# Patient Record
Sex: FEMALE | Race: WHITE | Employment: STUDENT | URBAN - METROPOLITAN AREA
[De-identification: names, ages, dates, MRNs, and addresses within clinical notes are randomized per-mention and may not be internally consistent; named-entity substitution may affect disease eponyms.]

---

## 2017-02-23 RX ORDER — BUSPIRONE HYDROCHLORIDE 15 MG/1
15 TABLET ORAL 2 TIMES DAILY
COMMUNITY
End: 2022-09-02

## 2017-02-23 RX ORDER — DEXTROAMPHETAMINE SACCHARATE, AMPHETAMINE ASPARTATE, DEXTROAMPHETAMINE SULFATE AND AMPHETAMINE SULFATE 3.75; 3.75; 3.75; 3.75 MG/1; MG/1; MG/1; MG/1
15 TABLET ORAL
COMMUNITY

## 2017-02-23 RX ORDER — ESCITALOPRAM OXALATE 20 MG/1
20 TABLET ORAL
COMMUNITY
End: 2022-09-02

## 2017-02-23 RX ORDER — LANOLIN ALCOHOL/MO/W.PET/CERES
500 CREAM (GRAM) TOPICAL DAILY
COMMUNITY

## 2017-02-23 RX ORDER — ALPRAZOLAM 0.25 MG/1
0.25 TABLET ORAL
COMMUNITY
End: 2022-09-02

## 2017-02-23 RX ORDER — LEVOMEFOLATE CALCIUM 15 MG
15 TABLET ORAL
COMMUNITY

## 2017-02-23 NOTE — PERIOP NOTES
PRE-OP INSTRUCTIONS GIVEN OVER TELEPHONE , WHICH INCLUDED INSTRUCTIONS ON MEDICATIONS TO TAKE DAY OF SURGERY AND MEDICATIONS TO STOP PRIOR TO SURGERY. AS WELL AS VERBAL AND WRITTEN INSTRUCTIONS  ON USE OF CHLORHEXIDINE WIPES THE EVENING BEFORE SURGERY AND THE MORNING OF SURGERY. PATIENT'S ADDRESS CONFIRMED IN ORDER TO MAIL CHLORHEXIDINE WIPES AND WRITTEN INSTRUCTIONS FOR THEIR USE. PATIENT VOICED UNDERSTANDING OF ABOVE.

## 2017-03-02 ENCOUNTER — ANESTHESIA EVENT (OUTPATIENT)
Dept: SURGERY | Age: 22
End: 2017-03-02
Payer: SELF-PAY

## 2017-03-10 ENCOUNTER — ANESTHESIA (OUTPATIENT)
Dept: SURGERY | Age: 22
End: 2017-03-10
Payer: SELF-PAY

## 2017-03-10 ENCOUNTER — HOSPITAL ENCOUNTER (OUTPATIENT)
Age: 22
Setting detail: OUTPATIENT SURGERY
Discharge: HOME | End: 2017-03-10
Attending: PLASTIC SURGERY | Admitting: PLASTIC SURGERY
Payer: SELF-PAY

## 2017-03-10 ENCOUNTER — SURGERY (OUTPATIENT)
Age: 22
End: 2017-03-10

## 2017-03-10 VITALS
WEIGHT: 153 LBS | HEIGHT: 65 IN | RESPIRATION RATE: 16 BRPM | SYSTOLIC BLOOD PRESSURE: 112 MMHG | TEMPERATURE: 98.5 F | BODY MASS INDEX: 25.49 KG/M2 | DIASTOLIC BLOOD PRESSURE: 74 MMHG | OXYGEN SATURATION: 96 % | HEART RATE: 93 BPM

## 2017-03-10 LAB
DAILY QC (YES/NO)?: YES
HCG UR QL: NEGATIVE
HGB BLD-MCNC: 14.4 G/DL (ref 11.5–16)

## 2017-03-10 PROCEDURE — 77030008534 HC TBNG LIPOSUC BYRO -B: Performed by: PLASTIC SURGERY

## 2017-03-10 PROCEDURE — 74011250636 HC RX REV CODE- 250/636

## 2017-03-10 PROCEDURE — 77030003666 HC NDL SPINAL BD -A: Performed by: PLASTIC SURGERY

## 2017-03-10 PROCEDURE — 74011000250 HC RX REV CODE- 250: Performed by: PLASTIC SURGERY

## 2017-03-10 PROCEDURE — 77030008684 HC TU ET CUF COVD -B: Performed by: ANESTHESIOLOGY

## 2017-03-10 PROCEDURE — 74011250637 HC RX REV CODE- 250/637: Performed by: PLASTIC SURGERY

## 2017-03-10 PROCEDURE — 77030011640 HC PAD GRND REM COVD -A: Performed by: PLASTIC SURGERY

## 2017-03-10 PROCEDURE — C9290 INJ, BUPIVACAINE LIPOSOME: HCPCS | Performed by: PLASTIC SURGERY

## 2017-03-10 PROCEDURE — 76060000034 HC ANESTHESIA 1.5 TO 2 HR: Performed by: PLASTIC SURGERY

## 2017-03-10 PROCEDURE — 77030018836 HC SOL IRR NACL ICUM -A: Performed by: PLASTIC SURGERY

## 2017-03-10 PROCEDURE — 76210000000 HC OR PH I REC 2 TO 2.5 HR: Performed by: PLASTIC SURGERY

## 2017-03-10 PROCEDURE — 77030002974 HC SUT PLN J&J -A: Performed by: PLASTIC SURGERY

## 2017-03-10 PROCEDURE — 76010000153 HC OR TIME 1.5 TO 2 HR: Performed by: PLASTIC SURGERY

## 2017-03-10 PROCEDURE — 77030008467 HC STPLR SKN COVD -B: Performed by: PLASTIC SURGERY

## 2017-03-10 PROCEDURE — 77030020782 HC GWN BAIR PAWS FLX 3M -B

## 2017-03-10 PROCEDURE — 77030019702 HC WRP THER MENM -C: Performed by: PLASTIC SURGERY

## 2017-03-10 PROCEDURE — 77030013079 HC BLNKT BAIR HGGR 3M -A: Performed by: ANESTHESIOLOGY

## 2017-03-10 PROCEDURE — 77030033138 HC SUT PGA STRATFX J&J -B: Performed by: PLASTIC SURGERY

## 2017-03-10 PROCEDURE — 77030003028 HC SUT VCRL J&J -A: Performed by: PLASTIC SURGERY

## 2017-03-10 PROCEDURE — 74011000258 HC RX REV CODE- 258: Performed by: PLASTIC SURGERY

## 2017-03-10 PROCEDURE — 88305 TISSUE EXAM BY PATHOLOGIST: CPT | Performed by: PLASTIC SURGERY

## 2017-03-10 PROCEDURE — 77030018846 HC SOL IRR STRL H20 ICUM -A: Performed by: PLASTIC SURGERY

## 2017-03-10 PROCEDURE — 74011250636 HC RX REV CODE- 250/636: Performed by: PLASTIC SURGERY

## 2017-03-10 PROCEDURE — 76210000021 HC REC RM PH II 0.5 TO 1 HR: Performed by: PLASTIC SURGERY

## 2017-03-10 PROCEDURE — 77030011825 HC SUPP SURG PSTOP S2SG -B: Performed by: PLASTIC SURGERY

## 2017-03-10 PROCEDURE — 77030020255 HC SOL INJ LR 1000ML BG: Performed by: PLASTIC SURGERY

## 2017-03-10 PROCEDURE — 74011000250 HC RX REV CODE- 250

## 2017-03-10 PROCEDURE — 85018 HEMOGLOBIN: CPT | Performed by: ANESTHESIOLOGY

## 2017-03-10 PROCEDURE — 74011250636 HC RX REV CODE- 250/636: Performed by: ANESTHESIOLOGY

## 2017-03-10 PROCEDURE — 81025 URINE PREGNANCY TEST: CPT

## 2017-03-10 PROCEDURE — 77030032490 HC SLV COMPR SCD KNE COVD -B: Performed by: PLASTIC SURGERY

## 2017-03-10 RX ORDER — CEFAZOLIN SODIUM 1 G/3ML
INJECTION, POWDER, FOR SOLUTION INTRAMUSCULAR; INTRAVENOUS AS NEEDED
Status: DISCONTINUED | OUTPATIENT
Start: 2017-03-10 | End: 2017-03-10 | Stop reason: HOSPADM

## 2017-03-10 RX ORDER — ONDANSETRON 8 MG/1
8 TABLET, ORALLY DISINTEGRATING ORAL
Qty: 10 TAB | Refills: 1 | Status: SHIPPED | OUTPATIENT
Start: 2017-03-10

## 2017-03-10 RX ORDER — DIPHENHYDRAMINE HYDROCHLORIDE 50 MG/ML
12.5 INJECTION, SOLUTION INTRAMUSCULAR; INTRAVENOUS AS NEEDED
Status: DISCONTINUED | OUTPATIENT
Start: 2017-03-10 | End: 2017-03-10 | Stop reason: HOSPADM

## 2017-03-10 RX ORDER — SODIUM CHLORIDE, SODIUM LACTATE, POTASSIUM CHLORIDE, CALCIUM CHLORIDE 600; 310; 30; 20 MG/100ML; MG/100ML; MG/100ML; MG/100ML
75 INJECTION, SOLUTION INTRAVENOUS CONTINUOUS
Status: DISCONTINUED | OUTPATIENT
Start: 2017-03-10 | End: 2017-03-10 | Stop reason: HOSPADM

## 2017-03-10 RX ORDER — ONDANSETRON 2 MG/ML
4 INJECTION INTRAMUSCULAR; INTRAVENOUS
Status: COMPLETED | OUTPATIENT
Start: 2017-03-10 | End: 2017-03-10

## 2017-03-10 RX ORDER — GLYCOPYRROLATE 0.2 MG/ML
INJECTION INTRAMUSCULAR; INTRAVENOUS AS NEEDED
Status: DISCONTINUED | OUTPATIENT
Start: 2017-03-10 | End: 2017-03-10 | Stop reason: HOSPADM

## 2017-03-10 RX ORDER — HYDROMORPHONE HYDROCHLORIDE 1 MG/ML
0.2 INJECTION, SOLUTION INTRAMUSCULAR; INTRAVENOUS; SUBCUTANEOUS
Status: DISCONTINUED | OUTPATIENT
Start: 2017-03-10 | End: 2017-03-10 | Stop reason: HOSPADM

## 2017-03-10 RX ORDER — FENTANYL CITRATE 50 UG/ML
50 INJECTION, SOLUTION INTRAMUSCULAR; INTRAVENOUS AS NEEDED
Status: DISCONTINUED | OUTPATIENT
Start: 2017-03-10 | End: 2017-03-10 | Stop reason: HOSPADM

## 2017-03-10 RX ORDER — MIDAZOLAM HYDROCHLORIDE 1 MG/ML
INJECTION, SOLUTION INTRAMUSCULAR; INTRAVENOUS AS NEEDED
Status: DISCONTINUED | OUTPATIENT
Start: 2017-03-10 | End: 2017-03-10 | Stop reason: HOSPADM

## 2017-03-10 RX ORDER — OXYCODONE AND ACETAMINOPHEN 5; 325 MG/1; MG/1
2 TABLET ORAL
Qty: 50 TAB | Refills: 0 | Status: SHIPPED | OUTPATIENT
Start: 2017-03-10 | End: 2022-09-02

## 2017-03-10 RX ORDER — ROCURONIUM BROMIDE 10 MG/ML
INJECTION, SOLUTION INTRAVENOUS AS NEEDED
Status: DISCONTINUED | OUTPATIENT
Start: 2017-03-10 | End: 2017-03-10 | Stop reason: HOSPADM

## 2017-03-10 RX ORDER — FENTANYL CITRATE 50 UG/ML
INJECTION, SOLUTION INTRAMUSCULAR; INTRAVENOUS AS NEEDED
Status: DISCONTINUED | OUTPATIENT
Start: 2017-03-10 | End: 2017-03-10 | Stop reason: HOSPADM

## 2017-03-10 RX ORDER — ONDANSETRON 2 MG/ML
INJECTION INTRAMUSCULAR; INTRAVENOUS AS NEEDED
Status: DISCONTINUED | OUTPATIENT
Start: 2017-03-10 | End: 2017-03-10 | Stop reason: HOSPADM

## 2017-03-10 RX ORDER — MIDAZOLAM HYDROCHLORIDE 1 MG/ML
1 INJECTION, SOLUTION INTRAMUSCULAR; INTRAVENOUS AS NEEDED
Status: DISCONTINUED | OUTPATIENT
Start: 2017-03-10 | End: 2017-03-10 | Stop reason: HOSPADM

## 2017-03-10 RX ORDER — LIDOCAINE HYDROCHLORIDE 10 MG/ML
0.1 INJECTION, SOLUTION EPIDURAL; INFILTRATION; INTRACAUDAL; PERINEURAL AS NEEDED
Status: DISCONTINUED | OUTPATIENT
Start: 2017-03-10 | End: 2017-03-10 | Stop reason: HOSPADM

## 2017-03-10 RX ORDER — SODIUM CHLORIDE 0.9 % (FLUSH) 0.9 %
5-10 SYRINGE (ML) INJECTION EVERY 8 HOURS
Status: DISCONTINUED | OUTPATIENT
Start: 2017-03-10 | End: 2017-03-10 | Stop reason: HOSPADM

## 2017-03-10 RX ORDER — NEOSTIGMINE METHYLSULFATE 1 MG/ML
INJECTION INTRAVENOUS AS NEEDED
Status: DISCONTINUED | OUTPATIENT
Start: 2017-03-10 | End: 2017-03-10 | Stop reason: HOSPADM

## 2017-03-10 RX ORDER — SODIUM CHLORIDE, SODIUM LACTATE, POTASSIUM CHLORIDE, CALCIUM CHLORIDE 600; 310; 30; 20 MG/100ML; MG/100ML; MG/100ML; MG/100ML
125 INJECTION, SOLUTION INTRAVENOUS CONTINUOUS
Status: DISCONTINUED | OUTPATIENT
Start: 2017-03-10 | End: 2017-03-10 | Stop reason: HOSPADM

## 2017-03-10 RX ORDER — FENTANYL CITRATE 50 UG/ML
25 INJECTION, SOLUTION INTRAMUSCULAR; INTRAVENOUS
Status: DISCONTINUED | OUTPATIENT
Start: 2017-03-10 | End: 2017-03-10 | Stop reason: HOSPADM

## 2017-03-10 RX ORDER — MORPHINE SULFATE 2 MG/ML
2 INJECTION, SOLUTION INTRAMUSCULAR; INTRAVENOUS
Status: DISCONTINUED | OUTPATIENT
Start: 2017-03-10 | End: 2017-03-10 | Stop reason: HOSPADM

## 2017-03-10 RX ORDER — SODIUM CHLORIDE 9 MG/ML
25 INJECTION, SOLUTION INTRAVENOUS CONTINUOUS
Status: DISCONTINUED | OUTPATIENT
Start: 2017-03-10 | End: 2017-03-10 | Stop reason: HOSPADM

## 2017-03-10 RX ORDER — OXYCODONE AND ACETAMINOPHEN 5; 325 MG/1; MG/1
2 TABLET ORAL
Status: DISCONTINUED | OUTPATIENT
Start: 2017-03-10 | End: 2017-03-10 | Stop reason: HOSPADM

## 2017-03-10 RX ORDER — DEXAMETHASONE SODIUM PHOSPHATE 4 MG/ML
INJECTION, SOLUTION INTRA-ARTICULAR; INTRALESIONAL; INTRAMUSCULAR; INTRAVENOUS; SOFT TISSUE AS NEEDED
Status: DISCONTINUED | OUTPATIENT
Start: 2017-03-10 | End: 2017-03-10 | Stop reason: HOSPADM

## 2017-03-10 RX ORDER — PROPOFOL 10 MG/ML
INJECTION, EMULSION INTRAVENOUS AS NEEDED
Status: DISCONTINUED | OUTPATIENT
Start: 2017-03-10 | End: 2017-03-10 | Stop reason: HOSPADM

## 2017-03-10 RX ORDER — ROPIVACAINE HYDROCHLORIDE 5 MG/ML
30 INJECTION, SOLUTION EPIDURAL; INFILTRATION; PERINEURAL
Status: DISCONTINUED | OUTPATIENT
Start: 2017-03-10 | End: 2017-03-10 | Stop reason: HOSPADM

## 2017-03-10 RX ORDER — ONDANSETRON 2 MG/ML
INJECTION INTRAMUSCULAR; INTRAVENOUS
Status: COMPLETED
Start: 2017-03-10 | End: 2017-03-10

## 2017-03-10 RX ORDER — MIDAZOLAM HYDROCHLORIDE 1 MG/ML
0.5 INJECTION, SOLUTION INTRAMUSCULAR; INTRAVENOUS
Status: DISCONTINUED | OUTPATIENT
Start: 2017-03-10 | End: 2017-03-10 | Stop reason: HOSPADM

## 2017-03-10 RX ORDER — SODIUM CHLORIDE 0.9 % (FLUSH) 0.9 %
5-10 SYRINGE (ML) INJECTION AS NEEDED
Status: DISCONTINUED | OUTPATIENT
Start: 2017-03-10 | End: 2017-03-10 | Stop reason: HOSPADM

## 2017-03-10 RX ORDER — LIDOCAINE HYDROCHLORIDE 20 MG/ML
INJECTION, SOLUTION EPIDURAL; INFILTRATION; INTRACAUDAL; PERINEURAL AS NEEDED
Status: DISCONTINUED | OUTPATIENT
Start: 2017-03-10 | End: 2017-03-10 | Stop reason: HOSPADM

## 2017-03-10 RX ORDER — BUPIVACAINE HYDROCHLORIDE AND EPINEPHRINE 2.5; 5 MG/ML; UG/ML
10 INJECTION, SOLUTION EPIDURAL; INFILTRATION; INTRACAUDAL; PERINEURAL ONCE
Status: DISCONTINUED | OUTPATIENT
Start: 2017-03-10 | End: 2017-03-10 | Stop reason: HOSPADM

## 2017-03-10 RX ORDER — CEFAZOLIN SODIUM IN 0.9 % NACL 2 G/50 ML
2 INTRAVENOUS SOLUTION, PIGGYBACK (ML) INTRAVENOUS ONCE
Status: DISCONTINUED | OUTPATIENT
Start: 2017-03-10 | End: 2017-03-10 | Stop reason: HOSPADM

## 2017-03-10 RX ADMIN — SODIUM CHLORIDE, SODIUM LACTATE, POTASSIUM CHLORIDE, AND CALCIUM CHLORIDE 1000 ML: 600; 310; 30; 20 INJECTION, SOLUTION INTRAVENOUS at 08:32

## 2017-03-10 RX ADMIN — NEOSTIGMINE METHYLSULFATE 1 MG: 1 INJECTION INTRAVENOUS at 09:10

## 2017-03-10 RX ADMIN — MIDAZOLAM HYDROCHLORIDE 5 MG: 1 INJECTION, SOLUTION INTRAMUSCULAR; INTRAVENOUS at 07:33

## 2017-03-10 RX ADMIN — GLYCOPYRROLATE 0.2 MG: 0.2 INJECTION INTRAMUSCULAR; INTRAVENOUS at 09:10

## 2017-03-10 RX ADMIN — FENTANYL CITRATE 100 MCG: 50 INJECTION, SOLUTION INTRAMUSCULAR; INTRAVENOUS at 07:42

## 2017-03-10 RX ADMIN — ONDANSETRON 4 MG: 2 INJECTION INTRAMUSCULAR; INTRAVENOUS at 10:19

## 2017-03-10 RX ADMIN — PROPOFOL 50 MG: 10 INJECTION, EMULSION INTRAVENOUS at 08:53

## 2017-03-10 RX ADMIN — BUPIVACAINE 80 ML: 13.3 INJECTION, SUSPENSION, LIPOSOMAL INFILTRATION at 08:31

## 2017-03-10 RX ADMIN — FENTANYL CITRATE 25 MCG: 50 INJECTION, SOLUTION INTRAMUSCULAR; INTRAVENOUS at 10:45

## 2017-03-10 RX ADMIN — CEFAZOLIN SODIUM 2 G: 1 INJECTION, POWDER, FOR SOLUTION INTRAMUSCULAR; INTRAVENOUS at 07:47

## 2017-03-10 RX ADMIN — SODIUM CHLORIDE, SODIUM LACTATE, POTASSIUM CHLORIDE, AND CALCIUM CHLORIDE 125 ML/HR: 600; 310; 30; 20 INJECTION, SOLUTION INTRAVENOUS at 07:02

## 2017-03-10 RX ADMIN — ROCURONIUM BROMIDE 40 MG: 10 INJECTION, SOLUTION INTRAVENOUS at 07:42

## 2017-03-10 RX ADMIN — FENTANYL CITRATE 50 MCG: 50 INJECTION, SOLUTION INTRAMUSCULAR; INTRAVENOUS at 07:57

## 2017-03-10 RX ADMIN — OXYCODONE HYDROCHLORIDE AND ACETAMINOPHEN 1 TABLET: 5; 325 TABLET ORAL at 12:30

## 2017-03-10 RX ADMIN — FENTANYL CITRATE 25 MCG: 50 INJECTION, SOLUTION INTRAMUSCULAR; INTRAVENOUS at 10:50

## 2017-03-10 RX ADMIN — PROPOFOL 200 MG: 10 INJECTION, EMULSION INTRAVENOUS at 07:42

## 2017-03-10 RX ADMIN — ONDANSETRON 4 MG: 2 INJECTION INTRAMUSCULAR; INTRAVENOUS at 07:44

## 2017-03-10 RX ADMIN — DEXAMETHASONE SODIUM PHOSPHATE 4 MG: 4 INJECTION, SOLUTION INTRA-ARTICULAR; INTRALESIONAL; INTRAMUSCULAR; INTRAVENOUS; SOFT TISSUE at 07:44

## 2017-03-10 RX ADMIN — LIDOCAINE HYDROCHLORIDE 100 MG: 20 INJECTION, SOLUTION EPIDURAL; INFILTRATION; INTRACAUDAL; PERINEURAL at 07:42

## 2017-03-10 RX ADMIN — PROPOFOL 50 MG: 10 INJECTION, EMULSION INTRAVENOUS at 08:54

## 2017-03-10 NOTE — IP AVS SNAPSHOT
[address] 
[phone] Patient: Vinicius Perez MRN: [id number] :[id number] You are allergic to the following No active allergies Recent Documentation Height Weight BMI OB Status Smoking Status 1.651 m 69.4 kg 25.46 kg/m2 Having regular periods Never Smoker Emergency Contacts Name Discharge Info Relation Home Work Mobile Alie Lu DISCHARGE CAREGIVER [3] Mother [14] [phone] About your hospitalization You were admitted on:  March 10, 2017 You last received care in the:  [facility] PACU You were discharged on:  March 10, 2017 Unit phone number:  [phone] Why you were hospitalized Your primary diagnosis was:  Not on File Providers Seen During Your Hospitalizations Provider Role Specialty Primary office phone Julio Reese MD Attending Provider Plastic Surgery [phone] Your Primary Care Physician (PCP) Primary Care Physician Office Phone Office Fax [address] [facility], [address] [place] [address] [phone] Follow-up Information Follow up With Details Comments Contact Info Yuly Cordova MD   [address] [facility] 7 [phone] Julio Reese MD Schedule an appointment as soon as possible for a visit in 1 week(s)  [address] [facility] [address] [address] 
[phone] Current Discharge Medication List  
  
START taking these medications Dose & Instructions Dispensing Information Comments Morning Noon Evening Bedtime  
 ondansetron 8 mg disintegrating tablet Commonly known as:  ZOFRAN ODT Your next dose is: Today, Tomorrow Other:  _________ Dose:  8 mg Take 1 Tab by mouth every eight (8) hours as needed for Nausea. Quantity:  10 Tab Refills:  1  
     
   
   
   
  
 oxyCODONE-acetaminophen 5-325 mg per tablet Commonly known as:  PERCOCET Your next dose is: Today, Tomorrow Other:  _________ Dose:  2 Tab Take 2 Tabs by mouth every six (6) hours as needed for Pain. Max Daily Amount: 8 Tabs. Quantity:  50 Tab Refills:  0 CONTINUE these medications which have NOT CHANGED Dose & Instructions Dispensing Information Comments Morning Noon Evening Bedtime ADDERALL 15 mg tablet Generic drug:  dextroamphetamine-amphetamine Your next dose is: Today, Tomorrow Other:  _________ Dose:  15 mg Take 15 mg by mouth two (2) times daily as needed. Refills:  0  
     
   
   
   
  
 busPIRone 15 mg tablet Commonly known as:  BUSPAR Your next dose is: Today, Tomorrow Other:  _________ Dose:  15 mg Take 15 mg by mouth two (2) times a day. Refills:  0  
     
   
   
   
  
 L-METHYLFOLATE 15 mg tablet Generic drug:  l-methylfolate Your next dose is: Today, Tomorrow Other:  _________ Dose:  15 mg Take 15 mg by mouth nightly. Refills:  0 LEXAPRO 20 mg tablet Generic drug:  escitalopram oxalate Your next dose is: Today, Tomorrow Other:  _________ Dose:  20 mg Take 20 mg by mouth nightly. Refills:  0  
     
   
   
   
  
 VITAMIN C 500 mg Chew Generic drug:  ascorbic acid (vitamin C) Your next dose is: Today, Tomorrow Other:  _________ Dose:  500 mg Take 500 mg by mouth daily. Refills:  0  
     
   
   
   
  
 XANAX 0.25 mg tablet Generic drug:  ALPRAZolam  
   
Your next dose is: Today, Tomorrow Other:  _________ Dose:  0.25 mg Take 0.25 mg by mouth two (2) times daily as needed for Anxiety. Refills:  0 Where to Get Your Medications Information on where to get these meds will be given to you by the nurse or doctor. ! Ask your nurse or doctor about these medications  
  ondansetron 8 mg disintegrating tablet  
 oxyCODONE-acetaminophen 5-325 mg per tablet Discharge Instructions Meet Roblero. Jada Padilla M.D. 
 
breast reduction post-operative instructions 1. Surgical Bra:  You will be in a surgical bra. This should be worn at all times except when showering for the first two weeks. There may be a small amount of oozing from the incisions for the first several days. This is normal.  The bra should be washed when it gets soiled. Pad the incision lines with gauze or maxipads to avoid irritation Drains: If you have drains, empty and record drainage twice daily. Place gauze around the drain sites to pad and protect the area. 2. Support bra:  After the first two weeks, you may wear a sports-type bra that fastens in the front and has NO UNDERWIRE. This should be worn day and night, except when showering, for a total of 6 weeks. 3. Activity:  Take it easy for the first several days. No cleaning, housework, or strenuous activity. Do not lift anything over 10 pounds, including children. You may resume non-vigorous activities at two weeks, then normal activities at four weeks. 4. Positioning:  Do not lie on your belly for two weeks. It is alright to lie on your side while sleeping. Bathing: You may shower the day after the surgery. No tub baths or swimming until incision lines are completely healed. You have glue on your incision lines, this will peel off after about 10-14 days 5. Medication:  You will receive prescriptions for nausea and pain medication. Take  the pain medication as needed according to the directions. Avoid aspirin and non-steroidal anti-inflammatories (e.g. ibuprofen) for two days after surgery. 6. Numbness:  Numbness or unusual sensations of the breasts are to be expected. It can take several weeks to months for these to resolve. Occasionally there may be persistent numbness. If your breasts become increasingly painful or tender, please call the office. 7. Call the Doctor at [phone] anytime if you have 
a. Persistent nausea /vomiting 
b. No relief from pain medication 
c. Excessive bleeding from your incisions and/or swelling of the breasts 
d. If you have a loss of consciousness If you feel that you are having a life threatening emergency, call 911 immediately, please have someone notify the office so that the surgeon can be notified. DISCHARGE SUMMARY from Nurse The following personal items collected during your admission are returned to you:  
Dental Appliance: Dental Appliances: None Vision: Visual Aid: None Hearing Aid:   
Jewelry: Jewelry: None Clothing: Clothing: Other (comment) (street clothes to pacu in personal belongings bag) Other Valuables: Other Valuables: None Valuables sent to safe: ALL CLOTHING/VALUABLES RETURNED TO PATIENT BEFORE D/C HOME 
 
PATIENT INSTRUCTIONS: 
 
The first meal should be something that is light; not greasy or spicy. If this is tolerated, then advance to regular diet as tolerated. After general anesthesia or intravenous sedation, for 24 hours or while taking prescription Narcotics: · Limit your activities · Do not drive and operate hazardous machinery · Do not make important personal or business decisions · Do  not drink alcoholic beverages If you have not urinated within 8 hours after discharge, please contact your surgeon on call. Pain · Take pain medication as directed by your doctor ·  Call your doctor if pain is NOT relieved by medication · DO NOT take aspirin or blood thinners until directed by your doctor Report the following to your surgeon: 
· Excessive pain, swelling, redness or odor of or around the surgical area · Temperature over 100.5 · Nausea and vomiting lasting longer than 4 hours or if unable to take medications · Any signs of decreased circulation or nerve impairment to extremity: change in color, persistent  numbness, tingling, coldness or increase pain · Any questions ALSO: 
FOLLOW ANY INSTRUCTIONS SPECIFIED BY YOUR SURGEON Follow-up Appointments Procedures  FOLLOW UP VISIT Appointment in: One Week Call [phone] for appt Call [phone] for appt Standing Status:   Standing Number of Occurrences:   1 Order Specific Question:   Appointment in Answer: One Week Follow-Up Phone Calls · Are usually made nursing staff, the day after your surgery · If you have any problems, call your doctor as needed The discharge information has been reviewed with the patient. The patient verbalized understanding. Discharge medications reviewed with the patient and appropriate educational materials and side effects teaching were provided. *  Please give a list of your current medications to your Primary Care Provider. *  Please update this list whenever your medications are discontinued, doses are 
    changed, or new medications (including over-the-counter products) are added. *  Please carry medication information at all times in case of emergency situations. These are general instructions for a healthy lifestyle: No smoking/ No tobacco products/ Avoid exposure to second hand smoke Surgeon General's Warning:  Quitting smoking now greatly reduces serious risk to your health. Obesity, smoking, and sedentary lifestyle greatly increases your risk for illness A healthy diet, regular physical exercise & weight monitoring are important for maintaining a healthy lifestyle You may be retaining fluid if you have a history of heart failure or if you experience any of the following symptoms:  Weight gain of 3 pounds or more overnight or 5 pounds in a week, increased swelling in our hands or feet or shortness of breath while lying flat in bed.   Please call your doctor as soon as you notice any of these symptoms; do not wait until your next office visit. Recognize signs and symptoms of STROKE: 
 
F-face looks uneven A-arms unable to move or move unevenly S-speech slurred or non-existent T-time-call 911 as soon as signs and symptoms begin-DO NOT go Back to bed or wait to see if you get better-TIME IS BRAIN. Warning Signs of HEART ATTACK Call 911 if you have these symptoms: 
? Chest discomfort. Most heart attacks involve discomfort in the center of the chest that lasts more than a few minutes, or that goes away and comes back. It can feel like uncomfortable pressure, squeezing, fullness, or pain. ? Discomfort in other areas of the upper body. Symptoms can include pain or discomfort in one or both arms, the back, neck, jaw, or stomach. ? Shortness of breath with or without chest discomfort. ? Other signs may include breaking out in a cold sweat, nausea, or lightheadedness. Don't wait more than five minutes to call [address]! Fast action can save your life. Calling 911 is almost always the fastest way to get lifesaving treatment. Emergency Medical Services staff can begin treatment when they arrive  up to an hour sooner than if someone gets to the hospital by car. ZOFRAN WAS GIVEN AT 10:19 IN RECOVERY ROOM Discharge Orders None Introducing [facility]! Mike Yanez introduces [employer] patient portal. Now you can access parts of your medical record, email your doctor's office, and request medication refills online. 1. In your internet browser, go to [email or web address]. [employer] 2. Click on the First Time User? Click Here link in the Sign In box. You will see the New Member Sign Up page. 3. Enter your [employer] Access Code exactly as it appears below. You will not need to use this code after youve completed the sign-up process.  If you do not sign up before the expiration date, you must request a new code. 
 
· [employer] Access Code: [email or web address] Expires: 5/17/2017  2:11 PM 
 
4. Enter the last four digits of your Social Security Number (xxxx) and Date of Birth (mm/dd/yyyy) as indicated and click Submit. You will be taken to the next sign-up page. 5. Create a [employer] ID. This will be your [employer] login ID and cannot be changed, so think of one that is secure and easy to remember. 6. Create a [employer] password. You can change your password at any time. 7. Enter your Password Reset Question and Answer. This can be used at a later time if you forget your password. 8. Enter your e-mail address. You will receive e-mail notification when new information is available in [address] 9. Click Sign Up. You can now view and download portions of your medical record. 10. Click the Download Summary menu link to download a portable copy of your medical information. If you have questions, please visit the Frequently Asked Questions section of the [employer] website. Remember, [employer] is NOT to be used for urgent needs. For medical emergencies, dial 911. Now available from your iPhone and Android! General Information Please provide this summary of care documentation to your next provider. Patient Signature:  ____________________________________________________________ Date:  ____________________________________________________________  
  
Ofelia Del Castillo Provider Signature:  ____________________________________________________________ Date:  ____________________________________________________________

## 2017-03-10 NOTE — PERIOP NOTES
WAS GETTING READY TO TRANSFER TO PHASE 2 AND PATIENT ASKED IF IT IS NORMAL THAT SHE IS STARTING TO FEEL PAIN IN HER BREAST NOW. SHE RATES A 6/10. PUT TRANSFER TO PHASE 2 ON HOLD AND GIVING PAIN MEDS. AND LET HER KNOW IT WAS NORMAL TO HAVE PAIN AFTER SURGERY.

## 2017-03-10 NOTE — H&P
Surgery History and Physical    Subjective:      Charley Ledbetter is a 22 y.o.  female who presents with large breasts. There are no active problems to display for this patient. Past Medical History:   Diagnosis Date    ADHD (attention deficit hyperactivity disorder)     Psychiatric disorder     ANXIETY      Past Surgical History:   Procedure Laterality Date    HX HEENT      ORAL SURGERY      Social History   Substance Use Topics    Smoking status: Never Smoker    Smokeless tobacco: Not on file    Alcohol use Yes      Comment: 2-3 ETOH WEEKLY      History reviewed. No pertinent family history. Prior to Admission medications    Medication Sig Start Date End Date Taking? Authorizing Provider   busPIRone (BUSPAR) 15 mg tablet Take 15 mg by mouth two (2) times a day. Yes Historical Provider   escitalopram oxalate (LEXAPRO) 20 mg tablet Take 20 mg by mouth nightly. Yes Historical Provider   l-methylfolate (L-METHYLFOLATE) 15 mg tablet Take 15 mg by mouth nightly. Yes Historical Provider   dextroamphetamine-amphetamine (ADDERALL) 15 mg tablet Take 15 mg by mouth two (2) times daily as needed. Yes Historical Provider   ascorbic acid, vitamin C, (VITAMIN C) 500 mg chew Take 500 mg by mouth daily. Yes Historical Provider   ALPRAZolam (XANAX) 0.25 mg tablet Take 0.25 mg by mouth two (2) times daily as needed for Anxiety. Yes Historical Provider     No Known Allergies      Review of Systems:    A comprehensive review of systems was negative except for that written in the History of Present Illness. Objective:     Patient Vitals for the past 8 hrs:   BP Temp Pulse Resp SpO2 Height Weight   03/10/17 0646 114/84 97.8 °F (36.6 °C) 82 17 100 % 5' 5\" (1.651 m) 69.4 kg (153 lb)       Temp (24hrs), Av.8 °F (36.6 °C), Min:97.8 °F (36.6 °C), Max:97.8 °F (36.6 °C)      Physical Exam:  General:  Alert, cooperative, no distress, appears stated age. Eyes:  Conjunctivae/corneas clear.  PERRL, EOMs intact. Fundi benign   Ears:  Normal TMs and external ear canals both ears. Nose: Nares normal. Septum midline. Mucosa normal. No drainage or sinus tenderness. Mouth/Throat: Lips, mucosa, and tongue normal. Teeth and gums normal.   Neck: Supple, symmetrical, trachea midline, no adenopathy, thyroid: no enlargment/tenderness/nodules, no carotid bruit and no JVD. Back:   Symmetric, no curvature. ROM normal. No CVA tenderness. Lungs:   Clear to auscultation bilaterally. Heart:  Regular rate and rhythm, S1, S2 normal, no murmur, click, rub or gallop. Abdomen:   Soft, non-tender. Bowel sounds normal. No masses,  No organomegaly. Extremities: Extremities normal, atraumatic, no cyanosis or edema. Pulses: 2+ and symmetric all extremities. Skin: Skin color, texture, turgor normal. No rashes or lesions   Lymph nodes: Cervical, supraclavicular, and axillary nodes normal.   Neurologic: CNII-XII intact. Normal strength, sensation and reflexes throughout. Labs:   Recent Results (from the past 24 hour(s))   HCG URINE, QL. - POC    Collection Time: 03/10/17  7:09 AM   Result Value Ref Range    Pregnancy test,urine (POC) NEGATIVE  NEG         Data Review:  CBC: No results found for: WBC, RBC, HGB, HCT, PLT, HGBEXT, HCTEXT, PLTEXT     Assessment:     Active Problems:    * No active hospital problems.  *      Plan:     Bilateral breast reduction    Signed By: Blake Oneil MD     March 10, 2017

## 2017-03-10 NOTE — DISCHARGE INSTRUCTIONS
Raisa Bañuelos M.D.    breast reduction post-operative instructions      1. Surgical Bra:  You will be in a surgical bra. This should be worn at all times except when showering for the first two weeks. There may be a small amount of oozing from the incisions for the first several days. This is normal.  The bra should be washed when it gets soiled. Pad the incision lines with gauze or maxipads to avoid irritation      Drains: If you have drains, empty and record drainage twice daily. Place gauze around the drain sites to pad and protect the area. 2. Support bra:  After the first two weeks, you may wear a sports-type bra that fastens in the front and has NO UNDERWIRE. This should be worn day and night, except when showering, for a total of 6 weeks. 3. Activity:  Take it easy for the first several days. No cleaning, housework, or strenuous activity. Do not lift anything over 10 pounds, including children. You may resume non-vigorous activities at two weeks, then normal activities at four weeks. 4. Positioning:  Do not lie on your belly for two weeks. It is alright to lie on your side while sleeping. Bathing: You may shower the day after the surgery. No tub baths or swimming until incision lines are completely healed. You have glue on your incision lines, this will peel off after about 10-14 days    5. Medication:  You will receive prescriptions for nausea and pain medication. Take  the pain medication as needed according to the directions. Avoid aspirin and non-steroidal anti-inflammatories (e.g. ibuprofen) for two days after surgery. 6. Numbness:  Numbness or unusual sensations of the breasts are to be expected. It can take several weeks to months for these to resolve. Occasionally there may be persistent numbness. If your breasts become increasingly painful or tender, please call the office. 7. Call the Doctor at [phone] anytime if you have  a.  Persistent nausea /vomiting  b. No relief from pain medication  c. Excessive bleeding from your incisions and/or swelling of the breasts  d. If you have a loss of consciousness     If you feel that you are having a life threatening emergency, call 911 immediately, please have someone notify the office so that the surgeon can be notified. DISCHARGE SUMMARY from Nurse    The following personal items collected during your admission are returned to you:   Dental Appliance: Dental Appliances: None  Vision: Visual Aid: None  Hearing Aid:    Jewelry: Jewelry: None  Clothing: Clothing: Other (comment) (street clothes to pacu in personal belongings bag)  Other Valuables: Other Valuables: None  Valuables sent to safe: ALL CLOTHING/VALUABLES RETURNED TO PATIENT BEFORE D/C HOME    PATIENT INSTRUCTIONS:    The first meal should be something that is light; not greasy or spicy. If this is tolerated, then advance to regular diet as tolerated. After general anesthesia or intravenous sedation, for 24 hours or while taking prescription Narcotics:  · Limit your activities  · Do not drive and operate hazardous machinery  · Do not make important personal or business decisions  · Do  not drink alcoholic beverages  If you have not urinated within 8 hours after discharge, please contact your surgeon on call.         Pain  · Take pain medication as directed by your doctor  ·  Call your doctor if pain is NOT relieved by medication  · DO NOT take aspirin or blood thinners until directed by your doctor    Report the following to your surgeon:  · Excessive pain, swelling, redness or odor of or around the surgical area  · Temperature over 100.5  · Nausea and vomiting lasting longer than 4 hours or if unable to take medications  · Any signs of decreased circulation or nerve impairment to extremity: change in color, persistent  numbness, tingling, coldness or increase pain  · Any questions    ALSO:  FOLLOW ANY INSTRUCTIONS SPECIFIED BY YOUR SURGEON       Follow-up Appointments   Procedures    FOLLOW UP VISIT Appointment in: One Week Call [phone] for appt     Call [phone] for appt     Standing Status:   Standing     Number of Occurrences:   1     Order Specific Question:   Appointment in     Answer: One Week         Follow-Up Phone Calls  · Are usually made nursing staff, the day after your surgery  · If you have any problems, call your doctor as needed      The discharge information has been reviewed with the patient. The patient verbalized understanding. Discharge medications reviewed with the patient and appropriate educational materials and side effects teaching were provided. *  Please give a list of your current medications to your Primary Care Provider. *  Please update this list whenever your medications are discontinued, doses are      changed, or new medications (including over-the-counter products) are added. *  Please carry medication information at all times in case of emergency situations. These are general instructions for a healthy lifestyle:    No smoking/ No tobacco products/ Avoid exposure to second hand smoke    Surgeon General's Warning:  Quitting smoking now greatly reduces serious risk to your health. Obesity, smoking, and sedentary lifestyle greatly increases your risk for illness    A healthy diet, regular physical exercise & weight monitoring are important for maintaining a healthy lifestyle    You may be retaining fluid if you have a history of heart failure or if you experience any of the following symptoms:  Weight gain of 3 pounds or more overnight or 5 pounds in a week, increased swelling in our hands or feet or shortness of breath while lying flat in bed. Please call your doctor as soon as you notice any of these symptoms; do not wait until your next office visit.     Recognize signs and symptoms of STROKE:    F-face looks uneven    A-arms unable to move or move unevenly    S-speech slurred or non-existent    T-time-call 911 as soon as signs and symptoms begin-DO NOT go       Back to bed or wait to see if you get better-TIME IS BRAIN. Warning Signs of HEART ATTACK     Call 911 if you have these symptoms:   Chest discomfort. Most heart attacks involve discomfort in the center of the chest that lasts more than a few minutes, or that goes away and comes back. It can feel like uncomfortable pressure, squeezing, fullness, or pain.  Discomfort in other areas of the upper body. Symptoms can include pain or discomfort in one or both arms, the back, neck, jaw, or stomach.  Shortness of breath with or without chest discomfort.  Other signs may include breaking out in a cold sweat, nausea, or lightheadedness. Don't wait more than five minutes to call 911 - MINUTES MATTER! Fast action can save your life. Calling 911 is almost always the fastest way to get lifesaving treatment. Emergency Medical Services staff can begin treatment when they arrive -- up to an hour sooner than if someone gets to the hospital by car.          ZOFRAN WAS GIVEN AT 10:19 IN RECOVERY ROOM

## 2017-03-10 NOTE — BRIEF OP NOTE
BRIEF OPERATIVE NOTE    Date of Procedure: 3/10/2017   Preoperative Diagnosis: MACROMASTIA   Postoperative Diagnosis: MACROMASTIA     Procedure(s):  BILATERAL BREAST REDUCTION  Surgeon(s) and Role:     * Elisa Cardona MD - Primary            Surgical Staff:  Circ-1: Dasha Anderson RN  Circ-Relief: Sterling Lopez RN  Scrub Tech-1: Ivonne Omalley  Surg Asst-1: [facility] Staff:  Sterling Lopez RN  Event Time In   Incision Start 3214   Incision Close 0911     Anesthesia: General   Estimated Blood Loss: minimal  Specimens:   ID Type Source Tests Collected by Time Destination   1 : right breast Fresh Breast  Elisa Cardona MD 3/10/2017 5659 Pathology   2 : left breast Fresh Breast  Elisa Cardona MD 3/10/2017 8584 Pathology      Findings: large breasts   Complications: none  Implants: * No implants in log *

## 2017-03-10 NOTE — ANESTHESIA PREPROCEDURE EVALUATION
Anesthetic History   No history of anesthetic complications            Review of Systems / Medical History  Patient summary reviewed, nursing notes reviewed and pertinent labs reviewed    Pulmonary  Within defined limits                 Neuro/Psych   Within defined limits           Cardiovascular  Within defined limits                     GI/Hepatic/Renal  Within defined limits              Endo/Other  Within defined limits           Other Findings              Physical Exam    Airway  Mallampati: I  TM Distance: > 6 cm  Neck ROM: normal range of motion   Mouth opening: Normal     Cardiovascular  Regular rate and rhythm,  S1 and S2 normal,  no murmur, click, rub, or gallop             Dental  No notable dental hx       Pulmonary  Breath sounds clear to auscultation               Abdominal  GI exam deferred       Other Findings            Anesthetic Plan    ASA: 1  Anesthesia type: general          Induction: Intravenous  Anesthetic plan and risks discussed with: Patient

## 2017-03-10 NOTE — ANESTHESIA POSTPROCEDURE EVALUATION
Post-Anesthesia Evaluation and Assessment    Patient: Jaylin Cisneros MRN: [id number]  SSN: [id number]    YOB: 1995  Age: 22 y.o. Sex: female       Cardiovascular Function/Vital Signs  Visit Vitals    /74 (BP 1 Location: Right arm, BP Patient Position: At rest;Supine; Head of bed elevated (Comment degrees))    Pulse 93    Temp 36.9 °C (98.5 °F)    Resp 16    Ht 5' 5\" (1.651 m)    Wt 69.4 kg (153 lb)    SpO2 96%    BMI 25.46 kg/m2       Patient is status post general anesthesia for Procedure(s):  BILATERAL BREAST REDUCTION. Nausea/Vomiting: None    Postoperative hydration reviewed and adequate. Pain:  Pain Scale 1: Numeric (0 - 10) (03/10/17 1140)  Pain Intensity 1: 0 (03/10/17 1140)   Managed    Neurological Status:   Neuro (WDL): Within Defined Limits (03/10/17 1030)   At baseline    Mental Status and Level of Consciousness: Arousable    Pulmonary Status:   O2 Device: Room air (03/10/17 1030)   Adequate oxygenation and airway patent    Complications related to anesthesia: None    Post-anesthesia assessment completed.  No concerns    Signed By: Tanya Salguero MD     March 10, 2017

## 2017-03-10 NOTE — OP NOTES
Patient:Nannette Simeon    MRN: [id number]    PREOPERATIVE DIAGNOSIS: Bilateral macromastia. POSTOPERATIVE DIAGNOSIS: Bilateral macromastia. OPERATIVE PROCEDURE: Bilateral breast reduction. SURGEON: Katie Bonilla MD    ANESTHESIA: General.    INDICATIONS AND DESCRIPTION: The patient is a 22year old female  with symptomatic macromastia causing neck, back, and shoulder pain. The procedure, risks and benefits of surgery were discussed with the  patient and informed consent was obtained. The  patient was seen the morning of surgery and marked in the standing position  for a modified Wise pattern using a superior medial pedicle. She was then  taken to the operating room and placed on the operating table. After  induction of general anesthesia, the chest was prepared and draped in  standard fashion. I circumscribed the nipple-areolar complex at 42 mm, and  then infiltrated both breasts with modified Klein's solution. Following  this, starting on the right side, I deepithelialized the superior medial pedicle,  then took the incision tangentially down to the chest wall, then did en  bloc resection of the inferomedial, inferolateral and superior lateral  quadrants and passed this off the field as a specimen. This breast was then  tailor tacked together. We then turned our attention to the left side, and  once again deepitheliazed the superior medial pedicle, took dissection down  tangentially to the chest wall, isolating the superior medial pedicle,  performing en bloc resection of the inferomedial, inferolateral and  superior lateral quadrants. This was tailor tacked. The sides were  compared. They were very similar in size and appearance.     We then closed in layers, placing 2-0 Vicryls at the cardinal points as  well as in the inframammary fold vertical junction as well as the closure  of the nipple-areolar complex, followed by running deep dermal 2-0 Monocryl for  the vertical and horizontal limbs and a running 2-0 Monoderm for the  nipple-areolar complex. The patient tolerated the procedure well and left  the operating room for the PACU in stable condition. COMPLICATIONS:    ESTIMATED BLOOD LOSS: 20 ml. SPECIMEN: Right breast, 368 g; left breast, 408 g.

## 2018-10-18 NOTE — PERIOP NOTES
Glasses Prescription given to patient. Patient denies pain, says she is just sleepy. We are back back on list for phase 2.

## 2022-09-02 ENCOUNTER — OFFICE VISIT (OUTPATIENT)
Dept: ENDOCRINOLOGY | Age: 27
End: 2022-09-02
Payer: COMMERCIAL

## 2022-09-02 VITALS
BODY MASS INDEX: 30.02 KG/M2 | HEART RATE: 86 BPM | HEIGHT: 65 IN | SYSTOLIC BLOOD PRESSURE: 123 MMHG | RESPIRATION RATE: 16 BRPM | WEIGHT: 180.2 LBS | DIASTOLIC BLOOD PRESSURE: 89 MMHG | OXYGEN SATURATION: 99 %

## 2022-09-02 DIAGNOSIS — E03.8 HYPOTHYROIDISM DUE TO HASHIMOTO'S THYROIDITIS: Primary | ICD-10-CM

## 2022-09-02 DIAGNOSIS — E06.3 HYPOTHYROIDISM DUE TO HASHIMOTO'S THYROIDITIS: Primary | ICD-10-CM

## 2022-09-02 PROCEDURE — 99204 OFFICE O/P NEW MOD 45 MIN: CPT | Performed by: INTERNAL MEDICINE

## 2022-09-02 RX ORDER — LEVOTHYROXINE SODIUM 25 UG/1
25 TABLET ORAL
COMMUNITY
Start: 2022-08-19 | End: 2022-09-02 | Stop reason: ALTCHOICE

## 2022-09-02 RX ORDER — DEXTROAMPHETAMINE SACCHARATE, AMPHETAMINE ASPARTATE, DEXTROAMPHETAMINE SULFATE AND AMPHETAMINE SULFATE 5; 5; 5; 5 MG/1; MG/1; MG/1; MG/1
TABLET ORAL
COMMUNITY
Start: 2022-08-23

## 2022-09-02 RX ORDER — DEXTROAMPHETAMINE SULFATE, DEXTROAMPHETAMINE SACCHARATE, AMPHETAMINE SULFATE AND AMPHETAMINE ASPARTATE 3.75; 3.75; 3.75; 3.75 MG/1; MG/1; MG/1; MG/1
CAPSULE, EXTENDED RELEASE ORAL
COMMUNITY
Start: 2022-08-10

## 2022-09-02 RX ORDER — LEVOTHYROXINE SODIUM 50 UG/1
50 TABLET ORAL
Qty: 90 TABLET | Refills: 3 | Status: SHIPPED | OUTPATIENT
Start: 2022-09-02 | End: 2022-09-07 | Stop reason: SDUPTHER

## 2022-09-02 NOTE — PROGRESS NOTES
This is a 43-year-old woman referred for evaluation and management of possible hypothyroidism. The patient states that she was in her usual state of health in college and was placed on birth control pills. She says she gained weight from a base weight of 155 pounds up to a maximum weight of 208 pounds. She then worked to get some of the weight off. In fact she was able to get her weight down to 180 pounds with dietary changes, discontinuation of the birth control pills, and discontinuation of Lexapro. In March 2022, she had thyroid test done in [place] where she was living. The TSH was found to be 2.03 with a free T4 of 0.88. She was started on levothyroxine 25 mcg daily. In May 2022 she had a TSH of 1.99, free T4 of 1.2 on the levothyroxine. She also had TPO antibodies performed at that time which returned greater than 900. Past medical history she has a history of ADHD  Family history is positive for Graves' disease in her mother who I cared for at the time of her pregnancy with the present patient    She has symptoms suggestive of hypothyroidism including hair loss, cold intolerance, brain fog and fatigue. She denies any changes in her periods. They have been regular off the birth control pills and not unusually heavy. To lose weight, she has been fasting for 16 hours. She has eggs and a day English muffin for breakfast.  Geoffery Britta is a meat and vegetable. She tries to fast 16 hours a day. She is increasing strength training 30 minutes a day in an effort to lose weight. Examination  HEENT unremarkable  Extraocular motility intact there is no evidence of lid lag, proptosis, or stare  Thyroid easily palpable about 1.25 times normal which moves easily with swallowing. There are no nodules palpable  Lungs clear  Heart reveals a regular rate and rhythm  Abdomen benign.   She does have a few violaceous stretch marks which are apparently improved based on pictures that she showed me today  Extremities unremarkable    Impression  1. Presumed hypothyroidism with an elevated TPO antibody of greater than 900 currently on levothyroxine 25 mcg daily  2. History of anxiety disorder/ADHD  3. Weight gain associated with birth control pills which is now improving with dietary changes and discontinuation of the birth control pills    Plan:  1. After considerable discussion, we have elected to increase the levothyroxine to 50 mcg. We will repeat blood testing in about 6 weeks. She is moving to [place] and she will have the labs done there and I will review the labs and call her  2. We will tentatively schedule her to be seen in 6 months unless she can find an endocrinologist in [facility].

## 2022-09-02 NOTE — PROGRESS NOTES
Identified pt with two pt identifiers(name and ). Reviewed record in preparation for visit and have obtained necessary documentation. Chief Complaint   Patient presents with    New Patient     thyroid      Vitals:    22 1023   BP: 123/89   Pulse: 86   Resp: 16   SpO2: 99%   Weight: 180 lb 3.2 oz (81.7 kg)   Height: 5' 5\" (1.651 m)       Health Maintenance Review: Patient reminded of \"due or due soon\" health maintenance. I have asked the patient to contact his/her primary care provider (PCP) for follow-up on his/her health maintenance. Coordination of Care Questionnaire:  :   1) Have you been to an emergency room, urgent care, or hospitalized since your last visit? If yes, where when, and reason for visit? no       2. Have seen or consulted any other health care provider since your last visit? If yes, where when, and reason for visit? NO      Patient is accompanied by self I have received verbal consent from Valentina Almonte to discuss any/all medical information while they are present in the room.

## 2022-09-07 DIAGNOSIS — E03.8 HYPOTHYROIDISM DUE TO HASHIMOTO'S THYROIDITIS: ICD-10-CM

## 2022-09-07 DIAGNOSIS — E06.3 HYPOTHYROIDISM DUE TO HASHIMOTO'S THYROIDITIS: ICD-10-CM

## 2022-09-07 RX ORDER — LEVOTHYROXINE SODIUM 50 UG/1
50 TABLET ORAL
Qty: 90 TABLET | Refills: 3 | Status: SHIPPED | OUTPATIENT
Start: 2022-09-07

## 2022-09-07 NOTE — TELEPHONE ENCOUNTER
9/7/2022  9:38 AM      Pt called today and left  a voice mail at 9:20 am.Pt stated she was here on Friday to see Leonel Alaniz. Pt stated she was supposed to get a refill for synthroid 50 mg but [facility]. Pt was following up to see if she could get it today. JQ#[phone]      Thanks,  Tony Medrano

## 2022-09-07 NOTE — TELEPHONE ENCOUNTER
[facility] stated that they did not receive the levothyroxine prescription. Requested Prescriptions     Pending Prescriptions Disp Refills    levothyroxine (SYNTHROID) 50 mcg tablet 90 Tablet 3     Sig: Take 1 Tablet by mouth Daily (before breakfast).

## 2022-11-03 ENCOUNTER — TELEPHONE (OUTPATIENT)
Dept: ENDOCRINOLOGY | Age: 27
End: 2022-11-03

## 2022-11-03 NOTE — TELEPHONE ENCOUNTER
Pt called and LVM 11/3 @ 4:09pm.    Pt stated she saw dr Iain Byrd about a month or two ago. She started a higher dose of her synthroid, he wanted to get lab results 6-8 weeks after. She wanted to give us the name of the lab [facility] near her and we can call and fax the labs there.     Pt# [phone]

## 2022-11-04 DIAGNOSIS — E03.8 HYPOTHYROIDISM DUE TO HASHIMOTO'S THYROIDITIS: Primary | ICD-10-CM

## 2022-11-04 DIAGNOSIS — E06.3 HYPOTHYROIDISM DUE TO HASHIMOTO'S THYROIDITIS: Primary | ICD-10-CM

## 2022-11-08 LAB
T4 FREE SERPL-MCNC: 1.16 NG/DL (ref 0.82–1.77)
TSH SERPL DL<=0.005 MIU/L-ACNC: 1.11 UIU/ML (ref 0.45–4.5)

## 2022-11-22 ENCOUNTER — TELEPHONE (OUTPATIENT)
Dept: ENDOCRINOLOGY | Age: 27
End: 2022-11-22

## 2022-11-22 DIAGNOSIS — E06.3 HYPOTHYROIDISM DUE TO HASHIMOTO'S THYROIDITIS: Primary | ICD-10-CM

## 2022-11-22 DIAGNOSIS — E03.8 HYPOTHYROIDISM DUE TO HASHIMOTO'S THYROIDITIS: Primary | ICD-10-CM

## 2022-11-22 NOTE — TELEPHONE ENCOUNTER
Spoke to the pt's mother and she stated that the pt is having hair loss and thinks that it is caused by the medication. She is asking for the pt to be put on the brand Synthroid instead of the generic.

## 2022-11-22 NOTE — TELEPHONE ENCOUNTER
11/22/2022  2:43 PM    Ms. Gorge Navarro (Pt's mother) called and stated the pharmacy is able to fill the prescription but Dr. Kimberlyn Green will need to write it as the brand name Synthroid and not the generic. Pt mother can be reached at [phone].     Thanks,   Ammon Hoff

## 2022-11-23 DIAGNOSIS — E06.3 HYPOTHYROIDISM DUE TO HASHIMOTO'S THYROIDITIS: ICD-10-CM

## 2022-11-23 DIAGNOSIS — E03.8 HYPOTHYROIDISM DUE TO HASHIMOTO'S THYROIDITIS: ICD-10-CM

## 2022-11-23 RX ORDER — LEVOTHYROXINE SODIUM 50 UG/1
50 TABLET ORAL
Qty: 90 TABLET | Refills: 3 | Status: SHIPPED | OUTPATIENT
Start: 2022-11-23 | End: 2022-11-23 | Stop reason: SDUPTHER

## 2022-11-23 NOTE — TELEPHONE ENCOUNTER
[facility] faxed us stating that the pt's insurance requires \"SOFIA-1 (Do Not Substitute/Brand Medically Necessary) on the synthroid Rx. Yes

## 2022-11-25 RX ORDER — LEVOTHYROXINE SODIUM 50 UG/1
50 TABLET ORAL
Qty: 90 TABLET | Refills: 3 | Status: SHIPPED | OUTPATIENT
Start: 2022-11-25

## 2023-03-02 ENCOUNTER — TELEPHONE (OUTPATIENT)
Dept: ENDOCRINOLOGY | Age: 28
End: 2023-03-02

## 2023-03-02 NOTE — TELEPHONE ENCOUNTER
3/2/2023  2:45 PM    Patient's mother Nasir Vera wants to make sure that Dereksandy Sanchez was not supposed to have any labs done before tomorrow's visit. Her dosage was changed and she thought the labs would be repeated. Please call her at [phone].     Thanks,  Brian Ga

## 2023-03-02 NOTE — TELEPHONE ENCOUNTER
Spoke to the pt's mom and informed her that the pt doesn't have a current lab order on file. Informed her that Dr. Nandini Meyer will send her to the lab tomorrow after her appointment if he would like labs done.

## 2023-03-03 ENCOUNTER — OFFICE VISIT (OUTPATIENT)
Dept: ENDOCRINOLOGY | Age: 28
End: 2023-03-03

## 2023-03-03 VITALS
HEART RATE: 87 BPM | HEIGHT: 65 IN | DIASTOLIC BLOOD PRESSURE: 78 MMHG | SYSTOLIC BLOOD PRESSURE: 108 MMHG | BODY MASS INDEX: 30.35 KG/M2 | WEIGHT: 182.2 LBS

## 2023-03-03 DIAGNOSIS — E03.8 HYPOTHYROIDISM DUE TO HASHIMOTO'S THYROIDITIS: Primary | ICD-10-CM

## 2023-03-03 DIAGNOSIS — E06.3 HYPOTHYROIDISM DUE TO HASHIMOTO'S THYROIDITIS: Primary | ICD-10-CM

## 2023-03-03 RX ORDER — DEXTROAMPHETAMINE SACCHARATE, AMPHETAMINE ASPARTATE, DEXTROAMPHETAMINE SULFATE AND AMPHETAMINE SULFATE 2.5; 2.5; 2.5; 2.5 MG/1; MG/1; MG/1; MG/1
10 TABLET ORAL 2 TIMES DAILY
COMMUNITY
Start: 2023-01-20

## 2023-03-03 NOTE — PROGRESS NOTES
This is a 60-year-old woman referred for evaluation and management of possible hypothyroidism. The patient states that she was in her usual state of health in college and was placed on birth control pills. She says she gained weight from a base weight of 155 pounds up to a maximum weight of 208 pounds. She then worked to get some of the weight off. In fact she was able to get her weight down to 180 pounds with dietary changes, discontinuation of the birth control pills, and discontinuation of Lexapro. In March 2022, she had thyroid test done in [place] where she was living. The TSH was found to be 2.03 with a free T4 of 0.88. She was started on levothyroxine 25 mcg daily. In May 2022 she had a TSH of 1.99, free T4 of 1.2 on the levothyroxine. She also had TPO antibodies performed at that time which returned greater than 900. We increased her levothyroxine to 50 mcg daily which she has been taking since September 2022. Past medical history she has a history of ADHD  Family history is positive for Graves' disease in her mother who I cared for at the time of her pregnancy with the present patient    She has symptoms suggestive of hypothyroidism including hair loss, cold intolerance, brain fog and fatigue. She denies any changes in her periods. They have been regular off the birth control pills and not unusually heavy. To lose weight, she has been fasting for 16 hours. She has eggs and a day English muffin for breakfast.  Tania Young is a meat and vegetable. She tries to fast 16 hours a day. She is increasing strength training 30 minutes a day in an effort to lose weight. She presents today very frustrated with the inability to lose weight despite her best efforts. Examination  Blood pressure 108/78  Pulse 80 and regular  Weight 182  BMI 30.3  HEENT unremarkable  Lungs clear  Heart reveals a regular rate and rhythm  Abdomen benign  Extremities unremarkable    Impression  1.   Autoimmune thyroid disease currently on 50 mcg of levothyroxine daily  2. Obesity    Plan:  1. I have repeated her thyroid test.  I did obtain a A1c as well to make sure she does not have diabetes  2. I will call her with the results and we will go from there.   3.  I did spend a great deal of time talking about strategies for weight loss

## 2023-09-06 ENCOUNTER — TELEPHONE (OUTPATIENT)
Age: 28
End: 2023-09-06

## 2023-09-06 DIAGNOSIS — E06.3 AUTOIMMUNE THYROIDITIS: Primary | ICD-10-CM

## 2023-09-06 NOTE — TELEPHONE ENCOUNTER
9/6/2023  1:32 PM      Patient mother Rosalia Vasquez called and wanted to know can the patient get her blood drawn in [place] instead of here. Pt mother also wanted to know can the patient turn the visit into a virtual.Pt mother would like a call back.       Pt mother JZVZL#[phone]    Thanks,  Hilda Ham

## 2023-09-06 NOTE — TELEPHONE ENCOUNTER
Spoke to mother.  Will order labs for [facility]. Also told mother I could do a VV this once

## 2023-09-12 ENCOUNTER — TELEPHONE (OUTPATIENT)
Age: 28
End: 2023-09-12

## 2023-09-12 NOTE — TELEPHONE ENCOUNTER
9/12/2023  9:50 AM      Pt's mother called and wanted to make sure the labs where ordered before they go to [email or web address]. Pt's call back number is [phone]. Thank you.   [facility]

## 2023-09-14 NOTE — TELEPHONE ENCOUNTER
9/14/2023  3:09 PM    Patient called and stated she was returning the nurse phone call.     #[phone]    Thanks,  Pa Longo

## 2023-09-23 LAB
ALBUMIN SERPL-MCNC: 4.4 G/DL (ref 4–5)
ALBUMIN/GLOB SERPL: 1.6 {RATIO} (ref 1.2–2.2)
ALP SERPL-CCNC: 74 IU/L (ref 44–121)
ALT SERPL-CCNC: 40 IU/L (ref 0–32)
AST SERPL-CCNC: 34 IU/L (ref 0–40)
BILIRUB SERPL-MCNC: <0.2 MG/DL (ref 0–1.2)
BUN SERPL-MCNC: 15 MG/DL (ref 6–20)
BUN/CREAT SERPL: 19 (ref 9–23)
CALCIUM SERPL-MCNC: 9.9 MG/DL (ref 8.7–10.2)
CHLORIDE SERPL-SCNC: 102 MMOL/L (ref 96–106)
CO2 SERPL-SCNC: 22 MMOL/L (ref 20–29)
CREAT SERPL-MCNC: 0.78 MG/DL (ref 0.57–1)
EGFRCR SERPLBLD CKD-EPI 2021: 107 ML/MIN/1.73
GLOBULIN SER CALC-MCNC: 2.8 G/DL (ref 1.5–4.5)
GLUCOSE SERPL-MCNC: 94 MG/DL (ref 70–99)
POTASSIUM SERPL-SCNC: 5.1 MMOL/L (ref 3.5–5.2)
PROT SERPL-MCNC: 7.2 G/DL (ref 6–8.5)
SODIUM SERPL-SCNC: 139 MMOL/L (ref 134–144)
T4 FREE SERPL-MCNC: 1.1 NG/DL (ref 0.82–1.77)
TSH SERPL DL<=0.005 MIU/L-ACNC: 1.49 UIU/ML (ref 0.45–4.5)

## 2023-10-04 ENCOUNTER — TELEMEDICINE (OUTPATIENT)
Age: 28
End: 2023-10-04
Payer: COMMERCIAL

## 2023-10-04 DIAGNOSIS — E06.3 AUTOIMMUNE THYROIDITIS: Primary | ICD-10-CM

## 2023-10-04 PROCEDURE — 99214 OFFICE O/P EST MOD 30 MIN: CPT | Performed by: INTERNAL MEDICINE

## 2023-10-04 RX ORDER — DEXTROAMPHETAMINE SACCHARATE, AMPHETAMINE ASPARTATE, DEXTROAMPHETAMINE SULFATE AND AMPHETAMINE SULFATE 1.25; 1.25; 1.25; 1.25 MG/1; MG/1; MG/1; MG/1
TABLET ORAL
COMMUNITY
Start: 2023-08-15

## 2023-10-04 RX ORDER — DEXTROAMPHETAMINE SACCHARATE, AMPHETAMINE ASPARTATE MONOHYDRATE, DEXTROAMPHETAMINE SULFATE AND AMPHETAMINE SULFATE 5; 5; 5; 5 MG/1; MG/1; MG/1; MG/1
CAPSULE, EXTENDED RELEASE ORAL
COMMUNITY
Start: 2023-08-08

## 2023-10-04 NOTE — PROGRESS NOTES
This is a 79-year-old woman referred for evaluation and management of possible hypothyroidism. The patient states that she was in her usual state of health in college and was placed on birth control pills. She says she gained weight from a base weight of 155 pounds up to a maximum weight of 208 pounds. She then worked to get some of the weight off. In fact she was able to get her weight down to 180 pounds with dietary changes, discontinuation of the birth control pills, and discontinuation of Lexapro. In March 2022, she had thyroid test done in [place] where she was living. The TSH was found to be 2.03 with a free T4 of 0.88. She was started on levothyroxine 25 mcg daily. In May 2022 she had a TSH of 1.99, free T4 of 1.2 on the levothyroxine. She also had TPO antibodies performed at that time which returned greater than 900. We increased her levothyroxine to 50 mcg daily which she has been taking since September 2022. Past medical history she has a history of ADHD  Family history is positive for Graves' disease in her mother who I cared for at the time of her pregnancy with the present patient    She has symptoms suggestive of hypothyroidism including hair loss, cold intolerance, brain fog and fatigue. She denies any changes in her periods. They have been regular off the birth control pills and not unusually heavy. She presents today on 50 ug  of levothyroxine with a TSH of 1.49 Free T4 1. 10. To lose weight, she has been fasting for 16 hours. She has eggs and a day English muffin for breakfast.  Vivien Peabody is a meat and vegetable. She tries to fast 16 hours a day. She is increasing strength training 30 minutes a day in an effort to lose weight. She presents today very frustrated with the inability to lose weight despite her best efforts.     Examination  GENERAL: NCAT, Appears well nourished   EYES: EOMI, non-icteric, no proptosis   Ear/Nose/Throat: NCAT, no visible inflammation or masses

## 2024-10-31 ENCOUNTER — TELEPHONE (OUTPATIENT)
Age: 29
End: 2024-10-31

## 2024-10-31 NOTE — TELEPHONE ENCOUNTER
Patient left  asking if next OV 11/22/24 could be virtual, as she has moved to [place], [place] and has a work conflicts for that day.   Patient can be reached at [phone].

## 2024-11-22 ENCOUNTER — TELEMEDICINE (OUTPATIENT)
Age: 29
End: 2024-11-22
Payer: COMMERCIAL

## 2024-11-22 DIAGNOSIS — E06.3 AUTOIMMUNE THYROIDITIS: Primary | ICD-10-CM

## 2024-11-22 PROCEDURE — 99214 OFFICE O/P EST MOD 30 MIN: CPT | Performed by: INTERNAL MEDICINE

## 2024-11-22 RX ORDER — SEMAGLUTIDE 1.7 MG/.75ML
INJECTION, SOLUTION SUBCUTANEOUS
COMMUNITY
Start: 2024-02-19

## 2024-11-22 RX ORDER — SUMATRIPTAN SUCCINATE 25 MG/1
TABLET ORAL
COMMUNITY
Start: 2024-10-11

## 2024-11-22 NOTE — PROGRESS NOTES
I have reviewed all needed documentation in preparation for visit.  Patient identified by name and date of birth.  Chief Complaint   Patient presents with    Telemedicine     Autoimmune thyroiditis           11/21/2024    10:57 PM   Patient-Reported Vitals   Patient-Reported Weight 168   Patient-Reported Height 5'5      No LMP recorded.        Health maintenance will be addressed with primary care provider Td Au MD at their next appointment.     \"Have you been to the ER, urgent care clinic since your last visit?  Hospitalized since your last visit?\"    NO    “Have you seen or consulted any other health care providers outside of [facility] since your last visit?”    NO

## 2024-11-22 NOTE — PROGRESS NOTES
Purnima VELAZCOYoan Savanna, was evaluated through a synchronous (real-time) audio-video encounter. The patient (or guardian if applicable) is aware that this is a billable service, which includes applicable co-pays. This Virtual Visit was conducted with patient's (and/or legal guardian's) consent. Patient identification was verified, and a caregiver was present when appropriate.   The patient was located at Home: [address]  [place] [address]  Provider was located at Facility (Appt Dept): [address] [place] [address] [address]  [place] [address]  Confirm you are appropriately licensed, registered, or certified to deliver care in the state where the patient is located as indicated above. If you are not or unsure, please re-schedule the visit: Yes, I confirm.      Total time spent for this encounter:  20    --Gumaro Cooper MD on 11/22/2024 at 8:35 AM    An electronic signature was used to authenticate this note.     This is a 29-year-old woman referred for evaluation and management of possible hypothyroidism.    The patient states that she was in her usual state of health in [facility] and was placed on birth control pills.  She says she gained weight from a base weight of 155 pounds up to a maximum weight of 208 pounds.  She then worked to get some of the weight off.  In fact she was able to get her weight down to 180 pounds with dietary changes, discontinuation of the birth control pills, and discontinuation of Lexapro.  In March 2022, she had thyroid test done in [place] where she was living.  The TSH was found to be 2.03 with a free T4 of 0.88.  She was started on levothyroxine 25 mcg daily.  In May 2022 she had a TSH of 1.99, free T4 of 1.2 on the levothyroxine.  She also had TPO antibodies performed at that time which returned greater than 900.  We increased her levothyroxine to 50 mcg daily which she has been taking since September 2022.    Past medical history she has a history of ADHD  Family history is positive

## 2024-11-26 ENCOUNTER — TELEPHONE (OUTPATIENT)
Age: 29
End: 2024-11-26

## 2024-11-26 NOTE — TELEPHONE ENCOUNTER
Pt LVM asking if lab order were placed. Informed pt via voice message that Dr Cooper placed orders directly in to [facility]'s system. Asked pt to ask for orders under Dr Cooper's name. Callback number left.

## 2024-11-28 LAB
T4 FREE SERPL-MCNC: 1.35 NG/DL (ref 0.82–1.77)
TSH SERPL DL<=0.005 MIU/L-ACNC: 1.62 UIU/ML (ref 0.45–4.5)

## 2024-12-02 ENCOUNTER — TELEPHONE (OUTPATIENT)
Age: 29
End: 2024-12-02

## 2024-12-03 DIAGNOSIS — E03.8 OTHER SPECIFIED HYPOTHYROIDISM: ICD-10-CM

## 2024-12-03 NOTE — TELEPHONE ENCOUNTER
Pt LVM stating she's received your message regarding her labs. She stated that she would need a refill for her synthroid sent to her pharmacy.     Requested Prescriptions     Pending Prescriptions Disp Refills    SYNTHROID 50 MCG tablet 90 tablet 3     Sig: Take 1 tablet by mouth every morning (before breakfast) DO NOT SUBSTITUTE. BRAND MEDICALLY NECESSARY.

## 2024-12-04 RX ORDER — LEVOTHYROXINE SODIUM 50 MCG
50 TABLET ORAL
Qty: 90 TABLET | Refills: 3 | Status: SHIPPED | OUTPATIENT
Start: 2024-12-04

## 2025-01-22 ENCOUNTER — TELEPHONE (OUTPATIENT)
Age: 30
End: 2025-01-22

## 2025-01-22 DIAGNOSIS — E06.3 AUTOIMMUNE THYROIDITIS: Primary | ICD-10-CM

## 2025-01-22 NOTE — TELEPHONE ENCOUNTER
Pt's mother LVM stating the pt would like to get a complete blood work up done and would like to have it done at [facility].

## 2025-01-31 LAB
ERYTHROCYTE [DISTWIDTH] IN BLOOD BY AUTOMATED COUNT: 11.9 % (ref 11.7–15.4)
HCT VFR BLD AUTO: 40.4 % (ref 34–46.6)
HGB BLD-MCNC: 13.6 G/DL (ref 11.1–15.9)
MCH RBC QN AUTO: 31.2 PG (ref 26.6–33)
MCHC RBC AUTO-ENTMCNC: 33.7 G/DL (ref 31.5–35.7)
MCV RBC AUTO: 93 FL (ref 79–97)
PLATELET # BLD AUTO: 277 X10E3/UL (ref 150–450)
RBC # BLD AUTO: 4.36 X10E6/UL (ref 3.77–5.28)
WBC # BLD AUTO: 4.4 X10E3/UL (ref 3.4–10.8)

## 2025-02-01 LAB
ALBUMIN SERPL-MCNC: 4.2 G/DL (ref 4–5)
ALP SERPL-CCNC: 65 IU/L (ref 44–121)
ALT SERPL-CCNC: 17 IU/L (ref 0–32)
AST SERPL-CCNC: 26 IU/L (ref 0–40)
BILIRUB SERPL-MCNC: 0.3 MG/DL (ref 0–1.2)
BUN SERPL-MCNC: 11 MG/DL (ref 6–20)
BUN/CREAT SERPL: 16 (ref 9–23)
CALCIUM SERPL-MCNC: 9.1 MG/DL (ref 8.7–10.2)
CHLORIDE SERPL-SCNC: 102 MMOL/L (ref 96–106)
CO2 SERPL-SCNC: 24 MMOL/L (ref 20–29)
CREAT SERPL-MCNC: 0.68 MG/DL (ref 0.57–1)
EGFRCR SERPLBLD CKD-EPI 2021: 122 ML/MIN/1.73
GLOBULIN SER CALC-MCNC: 2.4 G/DL (ref 1.5–4.5)
GLUCOSE SERPL-MCNC: 84 MG/DL (ref 70–99)
POTASSIUM SERPL-SCNC: 4.3 MMOL/L (ref 3.5–5.2)
PROT SERPL-MCNC: 6.6 G/DL (ref 6–8.5)
SODIUM SERPL-SCNC: 139 MMOL/L (ref 134–144)
T4 FREE SERPL-MCNC: 1.27 NG/DL (ref 0.82–1.77)
TSH SERPL DL<=0.005 MIU/L-ACNC: 1.16 UIU/ML (ref 0.45–4.5)

## (undated) DEVICE — ABDOMINAL PAD: Brand: DERMACEA

## (undated) DEVICE — SOLUTION IRRIG 1000ML H2O STRL BLT

## (undated) DEVICE — BRA SURG POST-OP XL 46IN --

## (undated) DEVICE — SYRINGE MED 20ML STD CLR PLAS LUERLOCK TIP N CTRL DISP

## (undated) DEVICE — KENDALL SCD EXPRESS SLEEVES, KNEE LENGTH, MEDIUM: Brand: KENDALL SCD

## (undated) DEVICE — NEEDLE HYPO 25GA L1.5IN BVL ORIENTED ECLIPSE

## (undated) DEVICE — SOLUTION LACTATED RINGERS INJECTION USP

## (undated) DEVICE — BASIC PACK: Brand: CONVERTORS

## (undated) DEVICE — (D)PREP SKN CHLRAPRP APPL 26ML -- CONVERT TO ITEM 371833

## (undated) DEVICE — TUBING SUCT L9FT FOR AUTOFUSE INFLTR SYS

## (undated) DEVICE — SKIN TEMPERATURE SENSOR: Brand: DEROYAL

## (undated) DEVICE — SIMPLICITY FLUFF UNDERPAD 23X36, MODERATE: Brand: SIMPLICITY

## (undated) DEVICE — DRAPE,REIN 53X77,STERILE: Brand: MEDLINE

## (undated) DEVICE — WRAP SURG W1.31XL1.34M CARD FOR PT 165-172CM THERMOWRP

## (undated) DEVICE — INTENDED FOR TISSUE SEPARATION, AND OTHER PROCEDURES THAT REQUIRE A SHARP SURGICAL BLADE TO PUNCTURE OR CUT.: Brand: BARD-PARKER ® CARBON RIB-BACK BLADES

## (undated) DEVICE — 1200 GUARD II KIT W/5MM TUBE W/O VAC TUBE: Brand: GUARDIAN

## (undated) DEVICE — DRAPE,CHEST,FENES,15X10,STERIL: Brand: MEDLINE

## (undated) DEVICE — NEEDLE HYPO 22GA L1.5IN BLK S STL HUB POLYPR SHLD REG BVL

## (undated) DEVICE — SPONGE LAP 18X18IN STRL -- 5/PK

## (undated) DEVICE — SUTURE VCRL SZ 2-0 L18IN ABSRB UD CT-1 L36MM 1/2 CIR J839D

## (undated) DEVICE — STAPLER SKIN 35CT WD STRL DISP -- MULTIFIRE PREMIUM

## (undated) DEVICE — NEEDLE SPNL 22GA L3.5IN BLK HUB S STL REG WALL FIT STYL W/

## (undated) DEVICE — Z DISCONTINUED USE 2716304 SUTURE STRATAFIX SPRL SZ 3-0 L12IN ABSRB UD FS-1 L24MM 3/8

## (undated) DEVICE — Z INACTIVE NO USAGE TURNOVER KIT RM CLEANOP

## (undated) DEVICE — SUT PLN 5-0 18IN P3 YEL --

## (undated) DEVICE — REM POLYHESIVE ADULT PATIENT RETURN ELECTRODE: Brand: VALLEYLAB

## (undated) DEVICE — TOWEL SURG W17XL27IN STD BLU COT NONFENESTRATED PREWASHED

## (undated) DEVICE — SOLUTION IV 1000ML 0.9% SOD CHL

## (undated) DEVICE — SKIN MARKER,REGULAR TIP WITH RULER AND LABELS: Brand: DEVON

## (undated) DEVICE — ASTOUND STANDARD SURGICAL GOWN, XL: Brand: CONVERTORS

## (undated) DEVICE — STERILE POLYISOPRENE POWDER-FREE SURGICAL GLOVES WITH EMOLLIENT COATING: Brand: PROTEXIS

## (undated) DEVICE — DEVON™ KNEE AND BODY STRAP 60" X 3" (1.5 M X 7.6 CM): Brand: DEVON

## (undated) DEVICE — SURGICAL PROCEDURE PACK BASIN MAJ SET CUST NO CAUT

## (undated) DEVICE — ROCKER SWITCH PENCIL BLADE ELECTRODE, HOLSTER: Brand: EDGE